# Patient Record
Sex: MALE | URBAN - METROPOLITAN AREA
[De-identification: names, ages, dates, MRNs, and addresses within clinical notes are randomized per-mention and may not be internally consistent; named-entity substitution may affect disease eponyms.]

---

## 2023-02-16 ENCOUNTER — OFFICE VISIT (OUTPATIENT)
Dept: URBAN - METROPOLITAN AREA CLINIC 58 | Facility: CLINIC | Age: 53
End: 2023-02-16
Payer: COMMERCIAL

## 2023-02-16 DIAGNOSIS — H25.13 AGE-RELATED NUCLEAR CATARACT, BILATERAL: ICD-10-CM

## 2023-02-16 DIAGNOSIS — E11.3413 TYPE 2 DIAB W SEVERE NONPRLF DIABETIC RTNOP W MACULAR EDEMA, BILATERAL: Primary | ICD-10-CM

## 2023-02-16 DIAGNOSIS — H52.13 MYOPIA, BILATERAL: ICD-10-CM

## 2023-02-16 PROCEDURE — 92004 COMPRE OPH EXAM NEW PT 1/>: CPT | Performed by: OPTOMETRIST

## 2023-02-16 ASSESSMENT — VISUAL ACUITY
OS: 20/25
OD: 20/25

## 2023-02-16 ASSESSMENT — INTRAOCULAR PRESSURE
OS: 16
OD: 16

## 2023-02-16 NOTE — IMPRESSION/PLAN
Impression: Type 2 diab w severe nonprlf diabetic rtnop w macular edema, bilateral: V60.4344. Plan: Diabetes type II: Severe background diabetic retinopathy, no signs of neovascularization noted. Will refer patient for a retinal consult to determine if treatment is necessary. Discussed ocular and systemic benefits of maintaining blood sugar control.

## 2023-02-16 NOTE — IMPRESSION/PLAN
Impression: Age-related nuclear cataract, bilateral: H25.13. Plan: Discussed diagnosis in detail with patient. No treatment is required at this time. Will continue to observe condition and or symptoms. Call if [address] worsens.

## 2024-02-16 ENCOUNTER — OFFICE VISIT (OUTPATIENT)
Dept: URBAN - METROPOLITAN AREA CLINIC 58 | Facility: CLINIC | Age: 54
End: 2024-02-16
Payer: COMMERCIAL

## 2024-02-16 DIAGNOSIS — H25.13 AGE-RELATED NUCLEAR CATARACT, BILATERAL: ICD-10-CM

## 2024-02-16 DIAGNOSIS — E11.3493 TYPE 2 DIAB W SEVERE NONPRLF DIAB RTNOP W/O MACULAR EDEMA, BILATERAL: Primary | ICD-10-CM

## 2024-02-16 PROCEDURE — 92014 COMPRE OPH EXAM EST PT 1/>: CPT | Performed by: OPTOMETRIST

## 2024-02-16 ASSESSMENT — INTRAOCULAR PRESSURE
OS: 14
OD: 14

## 2024-02-16 ASSESSMENT — KERATOMETRY
OD: 41.50
OS: 41.88

## 2024-02-23 ENCOUNTER — OFFICE VISIT (OUTPATIENT)
Dept: URBAN - METROPOLITAN AREA CLINIC 58 | Facility: CLINIC | Age: 54
End: 2024-02-23

## 2024-02-23 DIAGNOSIS — H52.13 MYOPIA, BILATERAL: Primary | ICD-10-CM

## 2024-02-23 PROCEDURE — 92012 INTRM OPH EXAM EST PATIENT: CPT | Performed by: OPTOMETRIST

## 2024-02-23 ASSESSMENT — VISUAL ACUITY
OS: 20/20
OD: 20/20

## 2024-11-05 ENCOUNTER — OFFICE VISIT (OUTPATIENT)
Dept: URBAN - METROPOLITAN AREA CLINIC 58 | Facility: CLINIC | Age: 54
End: 2024-11-05
Payer: COMMERCIAL

## 2024-11-05 DIAGNOSIS — H25.13 AGE-RELATED NUCLEAR CATARACT, BILATERAL: ICD-10-CM

## 2024-11-05 DIAGNOSIS — H40.013 OPEN ANGLE WITH BORDERLINE FINDINGS, LOW RISK, BILATERAL: ICD-10-CM

## 2024-11-05 DIAGNOSIS — E11.3493 TYPE 2 DIAB W SEVERE NONPRLF DIAB RTNOP W/O MACULAR EDEMA, BILATERAL: Primary | ICD-10-CM

## 2024-11-05 PROCEDURE — 92004 COMPRE OPH EXAM NEW PT 1/>: CPT | Performed by: OPHTHALMOLOGY

## 2024-11-05 ASSESSMENT — INTRAOCULAR PRESSURE
OD: 16
OS: 16

## 2024-11-05 ASSESSMENT — KERATOMETRY
OS: 42.00
OD: 226.63

## 2024-11-05 ASSESSMENT — VISUAL ACUITY
OD: 20/30
OS: 20/20

## 2025-02-19 ENCOUNTER — OFFICE VISIT (OUTPATIENT)
Dept: URBAN - METROPOLITAN AREA CLINIC 58 | Facility: CLINIC | Age: 55
End: 2025-02-19
Payer: COMMERCIAL

## 2025-02-19 DIAGNOSIS — E11.3493 TYPE 2 DIAB W SEVERE NONPRLF DIAB RTNOP W/O MACULAR EDEMA, BILATERAL: ICD-10-CM

## 2025-02-19 DIAGNOSIS — H04.123 DRY EYE SYNDROME OF BILATERAL LACRIMAL GLANDS: Primary | ICD-10-CM

## 2025-02-19 DIAGNOSIS — H40.013 OPEN ANGLE WITH BORDERLINE FINDINGS, LOW RISK, BILATERAL: ICD-10-CM

## 2025-02-19 PROCEDURE — 92133 CPTRZD OPH DX IMG PST SGM ON: CPT | Performed by: OPTOMETRIST

## 2025-02-19 PROCEDURE — 92014 COMPRE OPH EXAM EST PT 1/>: CPT | Performed by: OPTOMETRIST

## 2025-02-19 PROCEDURE — 92020 GONIOSCOPY: CPT | Performed by: OPTOMETRIST

## 2025-02-19 ASSESSMENT — KERATOMETRY
OD: 41.13
OS: 40.63

## 2025-02-19 ASSESSMENT — INTRAOCULAR PRESSURE
OD: 16
OS: 16

## 2025-02-19 ASSESSMENT — VISUAL ACUITY
OD: 20/30
OS: 20/30